# Patient Record
Sex: FEMALE | Race: WHITE | NOT HISPANIC OR LATINO | Employment: FULL TIME | URBAN - NONMETROPOLITAN AREA
[De-identification: names, ages, dates, MRNs, and addresses within clinical notes are randomized per-mention and may not be internally consistent; named-entity substitution may affect disease eponyms.]

---

## 2024-05-05 PROCEDURE — 87205 SMEAR GRAM STAIN: CPT | Performed by: NURSE PRACTITIONER

## 2024-05-05 PROCEDURE — 87070 CULTURE OTHR SPECIMN AEROBIC: CPT | Performed by: NURSE PRACTITIONER

## 2024-05-06 ENCOUNTER — TELEMEDICINE (OUTPATIENT)
Dept: PSYCHIATRY | Facility: CLINIC | Age: 29
End: 2024-05-06
Payer: COMMERCIAL

## 2024-05-06 ENCOUNTER — TELEPHONE (OUTPATIENT)
Dept: URGENT CARE | Facility: CLINIC | Age: 29
End: 2024-05-06
Payer: COMMERCIAL

## 2024-05-06 ENCOUNTER — HOSPITAL ENCOUNTER (EMERGENCY)
Facility: HOSPITAL | Age: 29
Discharge: HOME | End: 2024-05-06
Attending: STUDENT IN AN ORGANIZED HEALTH CARE EDUCATION/TRAINING PROGRAM | Admitting: STUDENT IN AN ORGANIZED HEALTH CARE EDUCATION/TRAINING PROGRAM
Payer: COMMERCIAL

## 2024-05-06 VITALS
BODY MASS INDEX: 27.78 KG/M2 | TEMPERATURE: 98.3 F | OXYGEN SATURATION: 100 % | HEART RATE: 94 BPM | DIASTOLIC BLOOD PRESSURE: 89 MMHG | SYSTOLIC BLOOD PRESSURE: 137 MMHG | WEIGHT: 177 LBS | HEIGHT: 67 IN | RESPIRATION RATE: 16 BRPM

## 2024-05-06 DIAGNOSIS — J02.9 SORE THROAT: Primary | ICD-10-CM

## 2024-05-06 DIAGNOSIS — F41.9 ANXIETY DISORDER, UNSPECIFIED TYPE: Chronic | ICD-10-CM

## 2024-05-06 DIAGNOSIS — R41.840 ATTENTION AND CONCENTRATION DEFICIT: Chronic | ICD-10-CM

## 2024-05-06 DIAGNOSIS — F33.0 MILD EPISODE OF RECURRENT MAJOR DEPRESSIVE DISORDER: Primary | Chronic | ICD-10-CM

## 2024-05-06 LAB
B-HCG UR QL: NEGATIVE
EXPIRATION DATE: NORMAL
FLUAV RNA RESP QL NAA+PROBE: NOT DETECTED
FLUBV RNA RESP QL NAA+PROBE: NOT DETECTED
INTERNAL NEGATIVE CONTROL: NEGATIVE
INTERNAL POSITIVE CONTROL: POSITIVE
Lab: NORMAL
RSV RNA RESP QL NAA+PROBE: NOT DETECTED
S PYO AG THROAT QL: NEGATIVE
SARS-COV-2 RNA RESP QL NAA+PROBE: NOT DETECTED

## 2024-05-06 PROCEDURE — 87081 CULTURE SCREEN ONLY: CPT | Performed by: STUDENT IN AN ORGANIZED HEALTH CARE EDUCATION/TRAINING PROGRAM

## 2024-05-06 PROCEDURE — 81025 URINE PREGNANCY TEST: CPT | Performed by: STUDENT IN AN ORGANIZED HEALTH CARE EDUCATION/TRAINING PROGRAM

## 2024-05-06 PROCEDURE — 87637 SARSCOV2&INF A&B&RSV AMP PRB: CPT | Performed by: STUDENT IN AN ORGANIZED HEALTH CARE EDUCATION/TRAINING PROGRAM

## 2024-05-06 PROCEDURE — 99283 EMERGENCY DEPT VISIT LOW MDM: CPT

## 2024-05-06 PROCEDURE — 25010000002 KETOROLAC TROMETHAMINE PER 15 MG: Performed by: STUDENT IN AN ORGANIZED HEALTH CARE EDUCATION/TRAINING PROGRAM

## 2024-05-06 PROCEDURE — 96372 THER/PROPH/DIAG INJ SC/IM: CPT

## 2024-05-06 PROCEDURE — 99214 OFFICE O/P EST MOD 30 MIN: CPT | Performed by: NURSE PRACTITIONER

## 2024-05-06 PROCEDURE — 87880 STREP A ASSAY W/OPTIC: CPT | Performed by: STUDENT IN AN ORGANIZED HEALTH CARE EDUCATION/TRAINING PROGRAM

## 2024-05-06 RX ORDER — VENLAFAXINE HYDROCHLORIDE 37.5 MG/1
37.5 CAPSULE, EXTENDED RELEASE ORAL DAILY
Qty: 90 CAPSULE | Refills: 0 | Status: SHIPPED | OUTPATIENT
Start: 2024-05-06

## 2024-05-06 RX ORDER — ATOMOXETINE 60 MG/1
60 CAPSULE ORAL DAILY
Qty: 90 CAPSULE | Refills: 0 | Status: SHIPPED | OUTPATIENT
Start: 2024-05-06

## 2024-05-06 RX ORDER — KETOROLAC TROMETHAMINE 15 MG/ML
15 INJECTION, SOLUTION INTRAMUSCULAR; INTRAVENOUS ONCE
Status: COMPLETED | OUTPATIENT
Start: 2024-05-06 | End: 2024-05-06

## 2024-05-06 RX ADMIN — KETOROLAC TROMETHAMINE 15 MG: 15 INJECTION, SOLUTION INTRAMUSCULAR; INTRAVENOUS at 16:36

## 2024-05-08 LAB — BACTERIA SPEC AEROBE CULT: NORMAL

## 2024-06-04 ENCOUNTER — TELEPHONE (OUTPATIENT)
Dept: URGENT CARE | Facility: CLINIC | Age: 29
End: 2024-06-04

## 2024-06-04 NOTE — TELEPHONE ENCOUNTER
Can you call pt and see if she was able to make it to the ED? I do not see in her chart that she went. Please remind her she needs further workup including labs to determine cause of her symptoms, especially since she declined the testing in office, stating she would go to ER. If she is not wanting to go to the ER advise her to follow up with PCP ASAP, for routine labs and possible repeat COVID/Flu and strep screening, since we didn't complete any of that in office.

## 2024-06-04 NOTE — TELEPHONE ENCOUNTER
Spoke with pt, she did not go to the ER. States that after she left she started having acid reflux symptoms and just thought it was that. She mentioned that she thinks the symptoms she was having were anxiety induced. Pt did state that if it happened again or she started to feel bad she would go to the ER.

## 2024-08-16 DIAGNOSIS — R41.840 ATTENTION AND CONCENTRATION DEFICIT: Chronic | ICD-10-CM

## 2024-08-16 DIAGNOSIS — F33.0 MILD EPISODE OF RECURRENT MAJOR DEPRESSIVE DISORDER: Chronic | ICD-10-CM

## 2024-08-16 DIAGNOSIS — F41.9 ANXIETY DISORDER, UNSPECIFIED TYPE: Chronic | ICD-10-CM

## 2024-08-20 RX ORDER — ATOMOXETINE 60 MG/1
60 CAPSULE ORAL DAILY
Qty: 30 CAPSULE | Refills: 0 | Status: SHIPPED | OUTPATIENT
Start: 2024-08-20

## 2024-08-20 RX ORDER — VENLAFAXINE HYDROCHLORIDE 37.5 MG/1
37.5 CAPSULE, EXTENDED RELEASE ORAL DAILY
Qty: 30 CAPSULE | Refills: 0 | Status: SHIPPED | OUTPATIENT
Start: 2024-08-20

## 2024-09-19 DIAGNOSIS — R41.840 ATTENTION AND CONCENTRATION DEFICIT: Chronic | ICD-10-CM

## 2024-09-19 DIAGNOSIS — F33.0 MILD EPISODE OF RECURRENT MAJOR DEPRESSIVE DISORDER: Chronic | ICD-10-CM

## 2024-09-19 DIAGNOSIS — F41.9 ANXIETY DISORDER, UNSPECIFIED TYPE: Chronic | ICD-10-CM

## 2024-09-19 RX ORDER — ATOMOXETINE 60 MG/1
60 CAPSULE ORAL DAILY
Qty: 30 CAPSULE | Refills: 0 | Status: SHIPPED | OUTPATIENT
Start: 2024-09-19

## 2024-09-19 RX ORDER — VENLAFAXINE HYDROCHLORIDE 37.5 MG/1
37.5 CAPSULE, EXTENDED RELEASE ORAL DAILY
Qty: 30 CAPSULE | Refills: 0 | Status: SHIPPED | OUTPATIENT
Start: 2024-09-19

## 2024-09-30 ENCOUNTER — TELEMEDICINE (OUTPATIENT)
Dept: PSYCHIATRY | Facility: CLINIC | Age: 29
End: 2024-09-30
Payer: COMMERCIAL

## 2024-09-30 DIAGNOSIS — F33.0 MILD EPISODE OF RECURRENT MAJOR DEPRESSIVE DISORDER: Primary | Chronic | ICD-10-CM

## 2024-09-30 DIAGNOSIS — F41.9 ANXIETY DISORDER, UNSPECIFIED TYPE: Chronic | ICD-10-CM

## 2024-09-30 DIAGNOSIS — R41.840 ATTENTION AND CONCENTRATION DEFICIT: Chronic | ICD-10-CM

## 2024-09-30 PROCEDURE — 96127 BRIEF EMOTIONAL/BEHAV ASSMT: CPT | Performed by: NURSE PRACTITIONER

## 2024-09-30 PROCEDURE — 99214 OFFICE O/P EST MOD 30 MIN: CPT | Performed by: NURSE PRACTITIONER

## 2024-09-30 RX ORDER — VENLAFAXINE HYDROCHLORIDE 37.5 MG/1
37.5 CAPSULE, EXTENDED RELEASE ORAL DAILY
Qty: 90 CAPSULE | Refills: 0 | Status: SHIPPED | OUTPATIENT
Start: 2024-09-30

## 2024-09-30 RX ORDER — ATOMOXETINE 60 MG/1
60 CAPSULE ORAL DAILY
Qty: 90 CAPSULE | Refills: 0 | Status: SHIPPED | OUTPATIENT
Start: 2024-09-30

## 2024-09-30 NOTE — PROGRESS NOTES
This provider is located at the [facility] (through [facility]), [address], [facility], [address] using a secure [employer]hart Video Visit through [employer]. Patient is being seen remotely via telehealth at their home address in [place], and stated they are in a secure environment for this session. The patient's condition being diagnosed/treated is appropriate for telemedicine. The provider identified herself as well as her credentials.   The patient, and/or patients guardian, consent to be seen remotely, and when consent is given they understand that the consent allows for patient identifiable information to be sent to a third party as needed.   They may refuse to be seen remotely at any time. The electronic data is encrypted and password protected, and the patient and/or guardian has been advised of the potential risks to privacy not withstanding such measures.    You have chosen to receive care through a telehealth visit.  Do you consent to use a video/audio connection for your medical care today? Yes    Patient identifiers utilized: Name and date of birth.    Patient verbally confirmed consent for today's encounter  09/30/2024 .    The patient does verbally confirm they are being seen today while physically located in the [facility].  This provider/this APRN is licensed in the [facility] where the patient is located/being seen.     Subjective   Crys Grady is a 28 y.o. female who presents today for follow up    Chief Complaint: Medication management - Anxiety, depression, and difficulty concentrating follow-up    Accompanied by: The patient is interviewed alone at today's encounter    History of Present Illness:   -Since last encounter with this APRN/Office: The patient reports she recently got a promotion at work.  The patient report though things at work and home have been going good.  -Mood reported as: Doing good overall.  The patient reports she really  does not feel that she is experiencing symptoms of depression, and reports when she experiences symptoms of anxiety she is able to do things like deep breathing exercises that she reports helps with her anxiety symptoms.  -Patient rates symptoms of depression at a 0-1/10 on a 0-10 scale, with 10 being the worst.  -Patient rates symptoms of anxiety at a 3/10 on a 0-10 scale, with 10 being the worst.  -The patient denies any difficulty with focus/concentration with her current treatment regimen.    -Appetite reported as: Good  -Sleep reported as: Improved and good overall    -Changes in medications or new medical problems/concerns since last visit: Denies any  -Reported medication compliance: The patient reports compliance with their current psychotropic medication regimen.    -Reported medication side effects or concerns: Denies any    -Auditory or visual hallucinations: Denies  -Behaviors different from patient baseline, or any reckless, impulsive, or risky behaviors: Denies  -Symptoms of alonso or psychosis: Denies  -Self-injurious behavior: Denies  -SI/HI: The patient adamantly denies any suicidal or homicidal ideations, plans, or intent at the time of this encounter and is convincing.    -Using a shared decision-making approach the patient reports she would like to continue her current treatment/medication regimen without any adjustments/changes at this time.  When discussing medication efficacy with the patient, and reassessing the need and appropriateness of continued psychotropic medication treatment and doses, she reports she is pleased with management of symptoms at this time, and that her current treatment/medication regimen has continued to be effective for her and she does not want to make any changes or adjustments at today's encounter.  The patient reports she continues with therapy through the offices of [facility].  The patient reports her current insurance plan covers her therapy appointments, and  would cover medication management appointments, through the offices of [facility], and she plans to transfer her medication management appointments to the offices of [facility] after today's appointment with this APRN due to financial reasons.  The patient reports if there are any future changes and she needs to reestablish with this APRN/office she will reach back out to the office, and has the office number, as needed.    -The patient does verbally contract for safety at today's encounter and is in verbal agreement with the safety/crisis plan. The patient reports in her own words that she will reach out to this APRN/office prior to next scheduled appointment if there is any worsening of mood, any new psychiatric symptoms, any medication side effects or concerns, any concern for safety to self or others, any suicidal or homicidal ideations plans or intent, or any concerns, or they will call 911, call or text the suicide and crisis lifeline at [phone], or go to the closest emergency department.      PHQ-9 Depression Screening  Little interest or pleasure in doing things? (P) 0-->not at all   Feeling down, depressed, or hopeless? (P) 0-->not at all   Trouble falling or staying asleep, or sleeping too much? (P) 0-->not at all   Feeling tired or having little energy? (P) 0-->not at all   Poor appetite or overeating? (P) 0-->not at all   Feeling bad about yourself - or that you are a failure or have let yourself or your family down? (P) 0-->not at all   Trouble concentrating on things, such as reading the newspaper or watching television? (P) 0-->not at all   Moving or speaking so slowly that other people could have noticed? Or the opposite - being so fidgety or restless that you have been moving around a lot more than usual? (P) 0-->not at all   Thoughts that you would be better off dead, or of hurting yourself in some way? (P) 0-->not at all   PHQ-9 Total Score (P) 0   If you checked off any problems, how difficult have  these problems made it for you to do your work, take care of things at home, or get along with other people? (P) not difficult at all     PHQ-9 Total Score: (P) 0      PEDRO-7  Feeling nervous, anxious or on edge: (P) Several days  Not being able to stop or control worrying: (P) Not at all  Worrying too much about different things: (P) Not at all  Trouble Relaxing: (P) Not at all  Being so restless that it is hard to sit still: (P) Not at all  Feeling afraid as if something awful might happen: (P) Not at all  Becoming easily annoyed or irritable: (P) Not at all  PEDRO 7 Total Score: (P) 1  If you checked any problems, how difficult have these problems made it for you to do your work, take care of things at home, or get along with other people: (P) Not difficult at all      All Known Prior Psychiatric Medications and Responses if Known:  -Bupropion - made moods worse, especially depression, and caused suicidal thoughts  -Hydroxyzine 25 mg by mouth up to three times daily for itching when had poison ivy and allergies  -Trintellix - good during school but not after graduation  -Prozac - was not enough  -Zoloft - can't remember  -Lexapro - can't remember  -Buspar  -Strattera - patient reports effective  -Effexor - patient reports effective    Last Menstrual Period:  None due to IUD.  The patient reports she currently has an IUD in place and denies chance of pregnancy.  The patient was educated that her prescribed medications can have potential risk to a developing fetus. The patient is advised to contact this APRN/this office if she becomes pregnant or plans to become pregnant.  Pt verbalizes understanding and acknowledged agreement with this plan in her own words.        The following portions of the patient's history were reviewed and updated as appropriate: allergies, current medications, past family history, past medical history, past social history, past surgical history and problem list.            Past Medical  History:  Past Medical History:   Diagnosis Date    Asthma 5/25/2016    Gardasil injection series complete     Generalized anxiety disorder 5/25/2016       Social History:  Social History     Socioeconomic History    Marital status: Single   Tobacco Use    Smoking status: Never    Smokeless tobacco: Never   Vaping Use    Vaping status: Never Used   Substance and Sexual Activity    Alcohol use: Yes     Comment: States 3 times per week, 2 servings at a time    Drug use: Not Currently     Comment: The patient states she used to drink alcohol in excess, but not presently    Sexual activity: Yes     Partners: Male     Birth control/protection: OCP       Family History:  Family History   Problem Relation Age of Onset    Hypertension Father     Hyperlipidemia Father     Hyperlipidemia Mother     Hypertension Paternal Grandfather     Hypertension Paternal Grandmother     Mental illness Paternal Grandmother         undiagnosed officially    Clotting disorder Maternal Grandmother     Dementia Maternal Grandmother     Dementia Maternal Grandfather     Breast cancer Maternal Aunt 60        GREAT AUNT     Drug abuse Other     Mental illness Other         undiagnosed officially    Ovarian cancer Neg Hx     Uterine cancer Neg Hx     Colon cancer Neg Hx        Past Surgical History:  Past Surgical History:   Procedure Laterality Date    BREAST SURGERY      Lump removal, states benign    LAPAROSCOPIC CHOLECYSTECTOMY  07/2020    MOLE REMOVAL      from back - benign    WISDOM TOOTH EXTRACTION Bilateral        Problem List:  Patient Active Problem List   Diagnosis    Asthma    Generalized anxiety disorder    Routine health maintenance    Needs flu shot    Non-intractable vomiting with nausea    Allergic contact dermatitis due to plants, except food    Annual physical exam    Need for diphtheria-tetanus-pertussis (Tdap) vaccine, adult/adolescent    Need for vaccination against hepatitis A    Tonsillar hypertrophy    Snoring    Abdominal  pain    Colicky RUQ abdominal pain       Allergy:   No Known Allergies     Current Medications:   Current Outpatient Medications   Medication Sig Dispense Refill    atomoxetine (STRATTERA) 60 MG capsule Take 1 capsule by mouth Daily. 90 capsule 0    venlafaxine XR (Effexor XR) 37.5 MG 24 hr capsule Take 1 capsule by mouth Daily. 90 capsule 0    albuterol sulfate  (90 Base) MCG/ACT inhaler Inhale 2 puffs Every 4 (Four) Hours As Needed for Wheezing or Shortness of Air (or cough you cannot get under control). 18 g 0    cetirizine (zyrTEC) 10 MG tablet TAKE ONE TABLET BY MOUTH DAILY 30 tablet 10    fluticasone (FLONASE) 50 MCG/ACT nasal spray 2 sprays into the nostril(s) as directed by provider Every Night for 30 days. Administer 2 sprays in each nostril for each dose. 18.2 mL 0    IUD'S IU by Intrauterine route.       No current facility-administered medications for this visit.         Review of Symptoms:    Review of Systems   Psychiatric/Behavioral:  Positive for stress. Negative for agitation, behavioral problems, decreased concentration, dysphoric mood, hallucinations, self-injury, sleep disturbance, suicidal ideas, negative for hyperactivity and depressed mood. The patient is nervous/anxious.          Physical Exam:   not currently breastfeeding. There is no height or weight on file to calculate BMI.   Due to the remote nature of this encounter (virtual encounter), vitals were unable to be obtained.  Height stated at 66 inches.  Weight stated at around 177 pounds.        Physical Exam  Neurological:      Mental Status: She is alert and oriented to person, place, and time.   Psychiatric:         Attention and Perception: Attention normal.         Mood and Affect: Mood and affect normal.         Speech: Speech normal.         Behavior: Behavior normal. Behavior is cooperative.         Thought Content: Thought content normal. Thought content is not paranoid or delusional. Thought content does not include  homicidal or suicidal ideation. Thought content does not include homicidal or suicidal plan.         Cognition and Memory: Cognition and memory normal.         Judgment: Judgment normal.         Mental Status Exam:   Hygiene:   good  Cooperation:  Cooperative  Eye Contact:  Good  Psychomotor Behavior:  Appropriate  Affect:  Appropriate  Mood: normal  Hopelessness: Denies  Speech:  Normal  Thought Process:  Linear  Thought Content:  Mood congruent  Suicidal:  None  Homicidal:  None  Hallucinations:  None  Delusion:  None  Memory:  Intact  Orientation:  Person, Place, Time and Situation  Reliability:  good  Insight:  Good  Judgement:  Good  Impulse Control:  Good  Physical/Medical Issues:  No          Lab Results:   Admission on 08/07/2024, Discharged on 08/07/2024   Component Date Value Ref Range Status    SARS Antigen 08/07/2024 Not Detected  Not Detected, Presumptive Negative Final    Influenza A Antigen WALDO 08/07/2024 Not Detected  Not Detected Final    Influenza B Antigen WALDO 08/07/2024 Not Detected  Not Detected Final    Internal Control 08/07/2024 Passed  Passed Final    Lot Number 08/07/2024 [id number]   Final    Expiration Date 08/07/2024 2-5-2025   Final    Rapid Strep A Screen 08/07/2024 Negative  Negative, VALID, INVALID, Not Performed Final    Internal Control 08/07/2024 Passed  Passed Final    Lot Number 08/07/2024 [id number]   Final    Expiration Date 08/07/2024 11-   Final   Admission on 05/06/2024, Discharged on 05/06/2024   Component Date Value Ref Range Status    Strep A Ag 05/06/2024 Negative  Negative Final    COVID19 05/06/2024 Not Detected  Not Detected - Ref. Range Final    Influenza A PCR 05/06/2024 Not Detected  Not Detected Final    Influenza B PCR 05/06/2024 Not Detected  Not Detected Final    RSV, PCR 05/06/2024 Not Detected  Not Detected Final    HCG, Urine, QL 05/06/2024 Negative  Negative Final    Lot Number 05/06/2024 [id number]   Final    Internal Positive Control 05/06/2024  Positive  Positive, Passed Final    Internal Negative Control 05/06/2024 Negative  Negative, Passed Final    Expiration Date 05/06/2024 01/28/2025   Final    Throat Culture, Beta Strep 05/06/2024 No Beta Hemolytic Streptococcus Isolated   Final   Admission on 05/05/2024, Discharged on 05/05/2024   Component Date Value Ref Range Status    Rapid Strep A Screen 05/05/2024 Negative   Final    Internal Control 05/05/2024 Passed   Final    Lot Number 05/05/2024 [id number]   Final    Expiration Date 05/05/2024 11/01/2026   Final    Wound Culture 05/05/2024 Heavy growth (4+) Normal Throat Roxie   Final    Gram Stain 05/05/2024 No WBCs seen   Final    Gram Stain 05/05/2024 Occasional Epithelial cells seen   Final    Gram Stain 05/05/2024 Few (2+) Mixed bacterial morphotypes seen on Gram Stain   Final   Admission on 10/07/2023, Discharged on 10/07/2023   Component Date Value Ref Range Status    SARS Antigen 10/07/2023 Detected (A)  Not Detected, Presumptive Negative Final    Influenza A Antigen WALDO 10/07/2023 Not Detected  Not Detected Final    Influenza B Antigen WALDO 10/07/2023 Not Detected  Not Detected Final    Internal Control 10/07/2023 Passed  Passed Final    Lot Number 10/07/2023 [id number]   Final    Expiration Date 10/07/2023 10/10/2024   Final             Assessment & Plan   Problems Addressed this Visit    None  Visit Diagnoses       Mild episode of recurrent major depressive disorder  (Chronic)   -  Primary    Relevant Medications    venlafaxine XR (Effexor XR) 37.5 MG 24 hr capsule    atomoxetine (STRATTERA) 60 MG capsule    Anxiety disorder, unspecified type  (Chronic)       Relevant Medications    venlafaxine XR (Effexor XR) 37.5 MG 24 hr capsule    atomoxetine (STRATTERA) 60 MG capsule    Attention and concentration deficit  (Chronic)       Relevant Medications    atomoxetine (STRATTERA) 60 MG capsule          Diagnoses         Codes Comments    Mild episode of recurrent major depressive disorder    -  Primary  ICD-10-CM: F33.0  ICD-9-CM: 296.31     Anxiety disorder, unspecified type     ICD-10-CM: F41.9  ICD-9-CM: 300.00     Attention and concentration deficit     ICD-10-CM: R41.840  ICD-9-CM: 799.51             Visit Diagnoses:    ICD-10-CM ICD-9-CM   1. Mild episode of recurrent major depressive disorder  F33.0 296.31   2. Anxiety disorder, unspecified type  F41.9 300.00   3. Attention and concentration deficit  R41.840 799.51           GOALS:  Short Term Goals: Patient will be compliant with medication, and patient will have no significant medication related side effects.  Patient will be engaged in psychotherapy as indicated.  Patient will report subjective improvement of symptoms.  Long term goals: To stabilize mood and treat/improve subjective symptoms, the patient will stay out of the hospital, the patient will be at an optimal level of functioning, and the patient will take all medications as prescribed.  The patient verbalized understanding and agreement with goals that were mutually set.      TREATMENT PLAN: Continue supportive psychotherapy efforts and take medications as indicated.  Medication and treatment options, both pharmacological and non-pharmacological treatment options, discussed during today's visit, including any off label use of medication. Patient acknowledged and verbally consented with current treatment plan and was educated on the importance of compliance with treatment and follow-up appointments.      -Continue Effexor XR 37.5 mg by mouth once daily for moods.  -Continue Strattera 60 mg by mouth once daily for difficulty with concentration and focus, and also can assist with mood.  -The patient reports she continues with therapy through the offices of [facility].  The patient reports her current insurance plan covers her therapy appointments, and would cover medication management appointments, through the offices of [facility], and she plans to transfer her medication management appointments  to the offices of [facility] after today's appointment with this APRN due to financial reasons.  The patient reports if there are any future changes and she needs to reestablish with this APRN/office she will reach back out to the office, and has the office number, as needed.      MEDICATION ISSUES:  Discussed medication options and treatment plan of prescribed medication, any off label use of medication, as well as the risks, benefits, any black box warnings including increased suicidality, and side effects including but not limited to potential falls, dizziness, possible impaired driving, GI side effects (change in appetite, abdominal discomfort, nausea, vomiting, diarrhea, and/or constipation), dry mouth, somnolence, sedation, insomnia, activation, agitation, irritation, tremors, abnormal muscle movements or disorders, headache, sweating, possible bruising or rare bleeding, electrolyte and/or fluid abnormalities, change in blood pressure/heart rate/and or heart rhythm, sexual dysfunction, and metabolic adversities among others. Patient and/or guardian agreeable to call the office with any worsening of symptoms or onset of side effects, or if any concerns or questions arise.  The contact information for the office is made available to the patient and/or guardian.  Patient and/or guardian agreeable to call 911 or go to the nearest ER should they begin having any SI/HI, or if any urgent concerns arise. No medication side effects or related complaints today.    Due to the nature of virtual visits and inability to monitor vital signs and weight with virtual visits, the patient has been encouraged to monitor their vital signs and weight regularly either through self-monitoring via home device(s) or with their Primary Care Provider, and the patient has been instructed to notify this APRN of any abnormalities or changes from baseline.    It has been previously discussed with the patient about the possibility of  serotonin syndrome when certain medications are taken together, as is the case with this patient.  This APRN has provided the patient with a list of symptoms of serotonin syndrome including symptoms of autonomic instability, altered sensorium, confusion, restlessness, agitation, myoclonus, hyperreflexia, hyperthermia, diaphoresis, tremor, chills, diarrhea and cramps, ataxia, headache, migraines, seizures, and insomnia; which could lead to permanent hyperthermic brain damage, cardiovascular collapse, coma, or even death.  The patient is instructed to stop medications immediately and either contact this APRN/this office during regular office hours, or go to the emergency department/call 911, if they begin to experience any of the symptoms discussed.  The benefits and risks of the current medication regimen are discussed with the patient, and they feel that the benefits out weigh the risks.  The patient verbalized understanding and agreement in their own words.      VERBAL INFORMED CONSENT FOR MEDICATION:  The patient was educated that their proposed/prescribed psychotropic medication(s) has potential risks, side effects, adverse effects, and black box warnings; and these have been discussed with the patient.  The patient has been informed that their treatment and medication dosage is to be individualized, and may even be above or below the recommended range/dosage due to patient individualization and response, but medication is prescribed using a shared decision making approach, and no medication or dosage will be prescribed without the patient's verbal consent.  The reason for the use of the medication including any off label use and alternative modes of treatment other than or in addition to medication has been considered and discussed, the probable consequences of not receiving the proposed treatment have been discussed, and any treatment side effects, black box warnings, and cautions associated with treatment have  been discussed with the patient.  The patient is allowed ample time to openly discuss and ask questions regarding the proposed medication(s) and treatment plan and the patient verbalizes understanding the reasons for the use of the medication, its potential risks and benefits, other alternative treatment(s), and the probable consequences that may occur if the proposed medication is not given.  The patient has been given ample time to ask questions and study the information and find the information to be specific, accurate, and complete.  The patient gives verbal consent for the medication(s) proposed/prescribed, they verbalized understanding that they can refuse and withdraw consent at any time with the assistance of this APRN, and the patient has verbally confirmed that they are aware, and are willing, to take the prescribed medication and follow the treatment plan with the known possible risks, side effect, black box warnings, and any potential medication interactions, and the patient reports they will be worse off without this medication and treatment plan.  The patient is advised to contact this APRN/this office if any questions or concerns arise at any time (at [phone]), or call 911/go to the closest emergency department if needed or outside of office hours.      SUICIDE RISK ASSESSMENT AND SAFETY PLAN: Unalterable demographics and a history of mental health intervention indicate this patient is in a high risk category compared to the general population. At present, the patient denies active SI/HI, intentions, or plans at this time and agrees to seek immediate care should such thoughts develop. The patient verbalizes understanding of how to access emergency care if needed and agrees to do so. Consideration of suicide risk and protective factors such as history, current presentation, individual strengths and weaknesses, psychosocial and environmental stressors and variables, psychiatric illness and  symptoms, medical conditions and pain, took place in this interview. Based on those considerations, the patient is determined: within individual baseline and presenting no imminent risk for suicide or homicide. Other recommendations: The patient does not meet the criteria for inpatient admission and is not a safety risk to self or others at today's visit. Inpatient treatment offers no significant advantages over outpatient treatment for this patient at today's visit.  The patient was given ample time for questions and fully participated in treatment planning.  The patient was encouraged to call the clinic with any questions or concerns.  The patient was informed of access to emergency care. If patient were to develop any significant symptomatology, suicidal ideation, homicidal ideation, any concerns, or feel unsafe at any time they are to call the clinic and if unable to get immediate assistance should immediately call 911 or go to the nearest emergency room.  Patient contracted verbally for the following: If you are experiencing an emotional crisis or have thoughts of harming yourself or others, please go to your nearest local emergency room or call 911. Will continue to re-assess medication response and side effects frequently to establish efficacy and ensure safety. Risks, any black box warnings, side effects, off label usage, and benefits of medication and treatment discussed with patient, along with potential adverse side effects of current and/or newly prescribed medication, alternative treatment options, and OTC medications.  Patient verbalized understanding of potential risks, any off label use of medication, any black box warnings, and any side effects in their own words. The patient verbalized understanding and agreed to comply with the safety plan discussed in their own words.  Patient given the number to the office. Number also discussed of the 24- hour suicide hotline.           MEDS ORDERED DURING  VISIT:  New Medications Ordered This Visit   Medications    venlafaxine XR (Effexor XR) 37.5 MG 24 hr capsule     Sig: Take 1 capsule by mouth Daily.     Dispense:  90 capsule     Refill:  0    atomoxetine (STRATTERA) 60 MG capsule     Sig: Take 1 capsule by mouth Daily.     Dispense:  90 capsule     Refill:  0       Return in about 3 months (around 12/30/2024), or if symptoms worsen or fail to improve, for Next scheduled follow up and Recheck, pt reports she will be establishing with new PMHNP in October.       Progress towards goal: Not at goal    Functional status: Mild impairment     Prognosis: Good with Ongoing Treatment             This document has been electronically signed by ELVA Vieyra  September 30, 2024 11:44 EDT    Some of the data in this electronic note has been brought forward from a previous encounter, any necessary changes have been made, it has been reviewed by this APRN, and it is accurate.    Please note that portions of this note were completed with a voice recognition program.